# Patient Record
Sex: FEMALE | HISPANIC OR LATINO
[De-identification: names, ages, dates, MRNs, and addresses within clinical notes are randomized per-mention and may not be internally consistent; named-entity substitution may affect disease eponyms.]

---

## 2019-01-12 ENCOUNTER — TRANSCRIPTION ENCOUNTER (OUTPATIENT)
Age: 24
End: 2019-01-12

## 2024-04-11 ENCOUNTER — APPOINTMENT (OUTPATIENT)
Dept: ANTEPARTUM | Facility: CLINIC | Age: 29
End: 2024-04-11
Payer: MEDICAID

## 2024-04-11 ENCOUNTER — ASOB RESULT (OUTPATIENT)
Age: 29
End: 2024-04-11

## 2024-04-11 PROCEDURE — 76817 TRANSVAGINAL US OBSTETRIC: CPT

## 2024-04-24 PROBLEM — Z00.00 ENCOUNTER FOR PREVENTIVE HEALTH EXAMINATION: Status: ACTIVE | Noted: 2024-04-24

## 2024-07-11 ENCOUNTER — ASOB RESULT (OUTPATIENT)
Age: 29
End: 2024-07-11

## 2024-07-11 ENCOUNTER — APPOINTMENT (OUTPATIENT)
Dept: ANTEPARTUM | Facility: CLINIC | Age: 29
End: 2024-07-11

## 2024-07-11 PROCEDURE — 76817 TRANSVAGINAL US OBSTETRIC: CPT

## 2024-07-11 PROCEDURE — 76811 OB US DETAILED SNGL FETUS: CPT

## 2024-07-25 ENCOUNTER — APPOINTMENT (OUTPATIENT)
Dept: ANTEPARTUM | Facility: CLINIC | Age: 29
End: 2024-07-25
Payer: MEDICAID

## 2024-07-25 ENCOUNTER — ASOB RESULT (OUTPATIENT)
Age: 29
End: 2024-07-25

## 2024-07-25 PROCEDURE — 76816 OB US FOLLOW-UP PER FETUS: CPT

## 2024-08-29 ENCOUNTER — ASOB RESULT (OUTPATIENT)
Age: 29
End: 2024-08-29

## 2024-08-29 ENCOUNTER — APPOINTMENT (OUTPATIENT)
Dept: MATERNAL FETAL MEDICINE | Facility: CLINIC | Age: 29
End: 2024-08-29
Payer: MEDICAID

## 2024-08-29 VITALS — HEIGHT: 62 IN | WEIGHT: 186 LBS | BODY MASS INDEX: 34.23 KG/M2

## 2024-08-29 DIAGNOSIS — O24.119 PRE-EXISTING TYPE 2 DIABETES MELLITUS, TYPE 2, IN PREGNANCY, UNSPECIFIED TRIMESTER: ICD-10-CM

## 2024-08-29 PROCEDURE — G0108 DIAB MANAGE TRN  PER INDIV: CPT | Mod: 95

## 2024-08-29 RX ORDER — LANCETS
EACH MISCELLANEOUS
Qty: 1 | Refills: 2 | Status: ACTIVE | COMMUNITY
Start: 2024-08-29 | End: 1900-01-01

## 2024-08-29 RX ORDER — BLOOD SUGAR DIAGNOSTIC
STRIP MISCELLANEOUS 4 TIMES DAILY
Qty: 1 | Refills: 2 | Status: ACTIVE | COMMUNITY
Start: 2024-08-29 | End: 1900-01-01

## 2024-09-11 PROBLEM — Z83.3 FAMILY HISTORY OF DIABETES MELLITUS: Status: ACTIVE | Noted: 2024-09-11

## 2024-09-11 PROBLEM — B97.7 HPV IN FEMALE: Status: RESOLVED | Noted: 2024-09-11 | Resolved: 2024-09-11

## 2024-09-11 PROBLEM — O99.210 OBESITY COMPLICATING PREGNANCY: Status: ACTIVE | Noted: 2024-09-11

## 2024-09-11 PROBLEM — Z3A.30 30 WEEKS GESTATION OF PREGNANCY: Status: ACTIVE | Noted: 2024-09-11

## 2024-09-11 PROBLEM — Z82.49 FAMILY HISTORY OF CARDIOMEGALY: Status: ACTIVE | Noted: 2024-09-11

## 2024-09-13 ENCOUNTER — APPOINTMENT (OUTPATIENT)
Dept: MATERNAL FETAL MEDICINE | Facility: CLINIC | Age: 29
End: 2024-09-13
Payer: MEDICAID

## 2024-09-13 ENCOUNTER — ASOB RESULT (OUTPATIENT)
Age: 29
End: 2024-09-13

## 2024-09-13 ENCOUNTER — APPOINTMENT (OUTPATIENT)
Dept: ANTEPARTUM | Facility: CLINIC | Age: 29
End: 2024-09-13
Payer: MEDICAID

## 2024-09-13 VITALS
SYSTOLIC BLOOD PRESSURE: 107 MMHG | HEIGHT: 62 IN | DIASTOLIC BLOOD PRESSURE: 70 MMHG | HEART RATE: 71 BPM | OXYGEN SATURATION: 99 % | BODY MASS INDEX: 33.86 KG/M2 | WEIGHT: 184 LBS | RESPIRATION RATE: 18 BRPM

## 2024-09-13 DIAGNOSIS — Z82.49 FAMILY HISTORY OF ISCHEMIC HEART DISEASE AND OTHER DISEASES OF THE CIRCULATORY SYSTEM: ICD-10-CM

## 2024-09-13 DIAGNOSIS — O99.519 DISEASES OF THE RESPIRATORY SYSTEM COMPLICATING PREGNANCY, UNSPECIFIED TRIMESTER: ICD-10-CM

## 2024-09-13 DIAGNOSIS — Z3A.30 30 WEEKS GESTATION OF PREGNANCY: ICD-10-CM

## 2024-09-13 DIAGNOSIS — J45.909 DISEASES OF THE RESPIRATORY SYSTEM COMPLICATING PREGNANCY, UNSPECIFIED TRIMESTER: ICD-10-CM

## 2024-09-13 DIAGNOSIS — Z83.3 FAMILY HISTORY OF DIABETES MELLITUS: ICD-10-CM

## 2024-09-13 DIAGNOSIS — O99.210 OBESITY COMPLICATING PREGNANCY, UNSPECIFIED TRIMESTER: ICD-10-CM

## 2024-09-13 DIAGNOSIS — O35.2XX0 MATERNAL CARE FOR (SUSPECTED) HEREDITARY DISEASE IN FETUS, NOT APPLICABLE OR UNSPECIFIED: ICD-10-CM

## 2024-09-13 DIAGNOSIS — B97.7 PAPILLOMAVIRUS AS THE CAUSE OF DISEASES CLASSIFIED ELSEWHERE: ICD-10-CM

## 2024-09-13 DIAGNOSIS — J45.909 UNSPECIFIED ASTHMA, UNCOMPLICATED: ICD-10-CM

## 2024-09-13 PROCEDURE — 76819 FETAL BIOPHYS PROFIL W/O NST: CPT

## 2024-09-13 PROCEDURE — 76816 OB US FOLLOW-UP PER FETUS: CPT

## 2024-09-13 PROCEDURE — 76820 UMBILICAL ARTERY ECHO: CPT | Mod: 59

## 2024-09-13 PROCEDURE — 99205 OFFICE O/P NEW HI 60 MIN: CPT | Mod: TH

## 2024-09-13 PROCEDURE — 36415 COLL VENOUS BLD VENIPUNCTURE: CPT

## 2024-09-13 RX ORDER — PRENATAL VIT 49/IRON FUM/FOLIC 6.75-0.2MG
TABLET ORAL
Refills: 0 | Status: ACTIVE | COMMUNITY

## 2024-09-13 RX ORDER — BLOOD SUGAR DIAGNOSTIC
32G X 5 MM STRIP MISCELLANEOUS
Qty: 100 | Refills: 0 | Status: ACTIVE | COMMUNITY
Start: 2024-09-13 | End: 1900-01-01

## 2024-09-13 RX ORDER — ISOPROPYL ALCOHOL 0.7 ML/ML
SWAB TOPICAL
Qty: 1 | Refills: 0 | Status: ACTIVE | COMMUNITY
Start: 2024-09-13 | End: 1900-01-01

## 2024-09-13 RX ORDER — HUMAN INSULIN 100 [IU]/ML
100 INJECTION, SUSPENSION SUBCUTANEOUS
Qty: 1 | Refills: 0 | Status: ACTIVE | COMMUNITY
Start: 2024-09-13 | End: 1900-01-01

## 2024-09-13 NOTE — FAMILY HISTORY
[Age 35+ During Pregnancy] : not 35 or over during pregnancy [Reported Family History Of Birth Defects] : no congenital heart defects [Bryan-Sachs Carrier] : no Bryan-Sachs [Family History] : no mental retardation/autism [Reported Family History Of Genetic Disease] : no history of child defect in child of baby father [FreeTextEntry1] : Pt had previous child with a narrowed kidney ureter and current FOB has history of child with T21

## 2024-09-13 NOTE — OB HISTORY
[Definite:  ___ (Date)] : the last menstrual period was [unfilled] [LMP: ___] : LMP: [unfilled] [CAS: ___] : CAS: [unfilled] [EGA: ___ wks] : EGA: [unfilled] wks [Spontaneous] : Spontaneous conception [Sonogram] : sonogram [at ___ wks] : at [unfilled] weeks [___] : no pregnancy complications reported [Pregnancy History] : patient did not receive anesthesia [FreeTextEntry1] : [facility] for obesity,  previous child with congenital anomaly and type 2 DM. Pt is a 28 yr old  at 30w4d gestation today. Pt had a previous child with a different partner born with a narrowed kidney urerter that required NICU x3 days and f/u for one year. That child is now doing well. Pt's current FOB also has a history of a previous child born with T21 and death at 1 yr of age due to cardiac issues. No GC with current preg.  Level II sono= normal. Pt was diagnosed with Type 2 DM in 2019 by PCP. She was on an oral diabetic med for only one month and lost 80lbs, no endo follow up. Pt states she resumed T2DM f/u with this pregnancy. Pt last spoke to dietary on  and has a f/u appt scheduled for . Pt has 3meals/day, no snacks. Pt checks her [id number], fasting and 1 hr postprandial. Her - BS log attached. No A1C. Pt denies having any LOF, vaginal bleeding or ctxs. FGU today.

## 2024-09-13 NOTE — CHIEF COMPLAINT
[G ___] : G [unfilled] [P ___] : P [unfilled] [de-identified] : obesity, type 2DM, and previous child with congenital anomaly

## 2024-09-13 NOTE — DISCUSSION/SUMMARY
[FreeTextEntry1] : Dear NARENDRA Tran,    We had the pleasure of seeing your patient, RUPINDER DEL ROSARIO, for Maternal Fetal Medicine consultation on Sep 13, 2024. As you know, she is a  at 30 weeks 4 days referred for a history of type 2 diabetes. She is feeling overall well today and without complaints.  Vital signs are normal  Ms. Del Rosario was diagnosed with type 2 diabetes in 2019. She denies any recent follow up. She states that she made dietary changes and ultimately lost 80 lbs. She denies having diabetes in any of her prior pregnancies.   Patients with pregestational diabetes have a higher incidence of congenital malformations particularly if their Hemoglobin A1c is elevated at the time of organogenesis. There are no relevant labs available to review. She understands that diabetes puts her at risk for complications related to her kidneys, eyes, and small vessel disease. Studies are recommended to evaluate for potential maternal end organ damage.  Fetuses of patients with small vessel disease may be growth restricted and develop placental insufficiency later in pregnancy. She is aware that other complications include but are not limited to fetal macrosomia, birth injury, polyhydramnios,  birth,  metabolic complications and third trimester stillbirth. Maternal effects of diabetes include worsening of preexisting diabetic retinopathy, nephropathy, and vascular disease.  Furthermore, women with preexisting diabetes and vascular disease have a higher risk of developing preeclampsia.   To reduce these complications she was advised of the need for tight glucose control and close monitoring of her blood sugars. The aim of her diet is to keep the fasting sugars between 70-90 mg/dl and postprandial sugars below 140 mg /dl and with appropriate weight gain for the trimester. We suggest fetal surveillance with weekly NST/BPPs starting at 32 weeks and twice weekly testing at 36 weeks. Fetal echocardiogram is recommended and this study is scheduled for 10/25/24.   On review of her blood glucose log, Rupinder has persistent fasting elevations with a range of . 10/12 values are elevated. I confirmed that she has been fasting for at least 8 hours prior to checking. Her postprandial values are all normal.   I recommend that she start insulin for optimal glycemic control. We discussed the safety profile in pregnancy. After discussion with SIDNEY Spivey, I sent NPH 14 units qHS to her pharmacy.   - I recommend weekly fetal surveillance to begin next week.  - Labs sent today include: A1c, CBC, CMP, Urine protein to creatinine ratio - Serial growth ultrasounds q 4 weeks - Timing of delivery to be determined as pregnancy progresses.  - Follow up MFM consultation in 4 weeks   Thank you for requesting a consultation on this patient. The total time spent in preparation for this visit, medical history taking, orders, review of records, counseling the patient, and writing this note was 63 minutes.   At the end of our discussion, the patient indicated that her questions were answered and she seemed satisfied with our discussion. Please do not hesitate to contact us with any questions.   Sincerely,     Mirlande Otto MD FACOG Maternal-Fetal Medicine

## 2024-09-13 NOTE — SURGICAL HISTORY
[Abn Paps] : abnormal pap smear [STI's] : STI's [Last Pap: ___] : Last Pap: [unfilled] [Fibroids] : no fibroids [Breast Disease] : no breast disease [Infertility] : no infertility [Cysts] : no cysts [OC Use] : no OC use [Last Mammo: ___] : Last Mammo: none

## 2024-09-13 NOTE — VITALS
[LMP (date): ___] : LMP was on [unfilled] [GA =___ Weeks] : which calculates to a GA of [unfilled] weeks [GA= ___ Days] : and [unfilled] day(s) [CAS by LMP (date): ___] : The calculated CAS by LMP is [unfilled] [By LMP] : this is the final CAS

## 2024-09-16 ENCOUNTER — ASOB RESULT (OUTPATIENT)
Age: 29
End: 2024-09-16

## 2024-09-16 ENCOUNTER — APPOINTMENT (OUTPATIENT)
Dept: MATERNAL FETAL MEDICINE | Facility: CLINIC | Age: 29
End: 2024-09-16
Payer: MEDICAID

## 2024-09-16 LAB
ALBUMIN SERPL ELPH-MCNC: 3.9 G/DL
ALP BLD-CCNC: 117 U/L
ALT SERPL-CCNC: 22 U/L
ANION GAP SERPL CALC-SCNC: 18 MMOL/L
AST SERPL-CCNC: 17 U/L
BILIRUB SERPL-MCNC: 0.2 MG/DL
BUN SERPL-MCNC: 7 MG/DL
CALCIUM SERPL-MCNC: 9.2 MG/DL
CHLORIDE SERPL-SCNC: 99 MMOL/L
CO2 SERPL-SCNC: 22 MMOL/L
CREAT SERPL-MCNC: 0.4 MG/DL
CREAT SPEC-SCNC: 141 MG/DL
CREAT/PROT UR: 0.2 RATIO
EGFR: 136 ML/MIN/1.73M2
ESTIMATED AVERAGE GLUCOSE: 114 MG/DL
HBA1C MFR BLD HPLC: 5.6 %
POTASSIUM SERPL-SCNC: 4.3 MMOL/L
PROT SERPL-MCNC: 6.8 G/DL
PROT UR-MCNC: 29 MG/DL
SODIUM SERPL-SCNC: 139 MMOL/L

## 2024-09-16 PROCEDURE — G0108 DIAB MANAGE TRN  PER INDIV: CPT | Mod: 95

## 2024-09-27 ENCOUNTER — APPOINTMENT (OUTPATIENT)
Dept: ANTEPARTUM | Facility: CLINIC | Age: 29
End: 2024-09-27
Payer: MEDICAID

## 2024-09-27 ENCOUNTER — ASOB RESULT (OUTPATIENT)
Age: 29
End: 2024-09-27

## 2024-09-27 PROCEDURE — 76818 FETAL BIOPHYS PROFILE W/NST: CPT

## 2024-09-30 ENCOUNTER — ASOB RESULT (OUTPATIENT)
Age: 29
End: 2024-09-30

## 2024-09-30 ENCOUNTER — APPOINTMENT (OUTPATIENT)
Dept: MATERNAL FETAL MEDICINE | Facility: CLINIC | Age: 29
End: 2024-09-30
Payer: MEDICAID

## 2024-09-30 VITALS — HEIGHT: 62 IN | BODY MASS INDEX: 33.86 KG/M2 | WEIGHT: 184 LBS

## 2024-09-30 PROCEDURE — G0108 DIAB MANAGE TRN  PER INDIV: CPT | Mod: 95

## 2024-10-04 ENCOUNTER — APPOINTMENT (OUTPATIENT)
Dept: OPHTHALMOLOGY | Facility: CLINIC | Age: 29
End: 2024-10-04
Payer: MEDICAID

## 2024-10-04 ENCOUNTER — ASOB RESULT (OUTPATIENT)
Age: 29
End: 2024-10-04

## 2024-10-04 ENCOUNTER — NON-APPOINTMENT (OUTPATIENT)
Age: 29
End: 2024-10-04

## 2024-10-04 ENCOUNTER — APPOINTMENT (OUTPATIENT)
Dept: ANTEPARTUM | Facility: CLINIC | Age: 29
End: 2024-10-04

## 2024-10-04 PROCEDURE — 92004 COMPRE OPH EXAM NEW PT 1/>: CPT
